# Patient Record
Sex: FEMALE | Race: WHITE | URBAN - METROPOLITAN AREA
[De-identification: names, ages, dates, MRNs, and addresses within clinical notes are randomized per-mention and may not be internally consistent; named-entity substitution may affect disease eponyms.]

---

## 2022-06-02 DIAGNOSIS — E89.0 POSTSURGICAL HYPOTHYROIDISM: Primary | ICD-10-CM

## 2022-06-02 DIAGNOSIS — E89.2 POSTSURGICAL HYPOPARATHYROIDISM (HCC): ICD-10-CM

## 2022-09-09 DIAGNOSIS — E89.0 POSTSURGICAL HYPOTHYROIDISM: ICD-10-CM

## 2022-09-09 DIAGNOSIS — E89.2 POSTSURGICAL HYPOPARATHYROIDISM (HCC): ICD-10-CM

## 2022-09-09 LAB
25(OH)D3 SERPL-MCNC: 29.3 NG/ML (ref 30–100)
ALBUMIN SERPL-MCNC: 3.8 G/DL (ref 3.5–5)
ALBUMIN/GLOB SERPL: 0.9 {RATIO} (ref 1.2–3.5)
ALP SERPL-CCNC: 75 U/L (ref 50–136)
ALT SERPL-CCNC: 20 U/L (ref 12–65)
ANION GAP SERPL CALC-SCNC: 7 MMOL/L (ref 4–13)
AST SERPL-CCNC: 12 U/L (ref 15–37)
BILIRUB SERPL-MCNC: 0.4 MG/DL (ref 0.2–1.1)
BUN SERPL-MCNC: 10 MG/DL (ref 6–23)
CALCIUM SERPL-MCNC: 8.6 MG/DL (ref 8.3–10.4)
CHLORIDE SERPL-SCNC: 103 MMOL/L (ref 101–110)
CO2 SERPL-SCNC: 28 MMOL/L (ref 21–32)
CREAT SERPL-MCNC: 0.8 MG/DL (ref 0.6–1)
GLOBULIN SER CALC-MCNC: 4.2 G/DL (ref 2.3–3.5)
GLUCOSE SERPL-MCNC: 89 MG/DL (ref 65–100)
POTASSIUM SERPL-SCNC: 3.8 MMOL/L (ref 3.5–5.1)
PROT SERPL-MCNC: 8 G/DL (ref 6.3–8.2)
SODIUM SERPL-SCNC: 138 MMOL/L (ref 136–145)
T4 FREE SERPL-MCNC: 1.7 NG/DL (ref 0.78–1.46)
TSH, 3RD GENERATION: 0.57 UIU/ML (ref 0.36–3.74)

## 2022-09-14 ENCOUNTER — OFFICE VISIT (OUTPATIENT)
Dept: ENDOCRINOLOGY | Age: 42
End: 2022-09-14
Payer: MEDICAID

## 2022-09-14 VITALS
BODY MASS INDEX: 33.12 KG/M2 | WEIGHT: 199 LBS | DIASTOLIC BLOOD PRESSURE: 76 MMHG | OXYGEN SATURATION: 98 % | HEART RATE: 103 BPM | SYSTOLIC BLOOD PRESSURE: 98 MMHG

## 2022-09-14 DIAGNOSIS — E89.0 POSTSURGICAL HYPOTHYROIDISM: Primary | ICD-10-CM

## 2022-09-14 DIAGNOSIS — E89.2 POSTSURGICAL HYPOPARATHYROIDISM (HCC): ICD-10-CM

## 2022-09-14 PROCEDURE — 99214 OFFICE O/P EST MOD 30 MIN: CPT | Performed by: INTERNAL MEDICINE

## 2022-09-14 RX ORDER — UREA 10 %
1 LOTION (ML) TOPICAL 2 TIMES DAILY
Qty: 180 TABLET | Refills: 3
Start: 2022-09-14

## 2022-09-14 RX ORDER — LEVOTHYROXINE SODIUM 0.12 MG/1
125 TABLET ORAL
Qty: 30 TABLET | Refills: 11 | Status: SHIPPED | OUTPATIENT
Start: 2022-09-14

## 2022-09-14 NOTE — PROGRESS NOTES
Tali Estrada MD, [address]            Reason for visit: Follow-up of hypothyroidism      ASSESSMENT AND PLAN:    1. Postsurgical hypothyroidism  She is biochemically euthyroid. Continue levothyroxine as currently prescribed. - levothyroxine (SYNTHROID) 125 MCG tablet; Take 1 tablet by mouth every morning (before breakfast)  Dispense: 30 tablet; Refill: 11  - TSH with Reflex; Future    2. Postsurgical hypoparathyroidism (HCC)  Calcium remains slightly low, probably because she only takes the calcium supplement every other day. No changes. - calcium carbonate (OS-KARTHIKEYAN) 1250 (500 Ca) MG chewable tablet; Take 1 tablet by mouth 2 times daily  Dispense: 180 tablet; Refill: 3  - Comprehensive Metabolic Panel; Future  - Vitamin D 25 Hydroxy; Future      Follow-up and Dispositions    Return in about 1 year (around 9/14/2023). History of Present Illness:    THYROID DYSFUNCTION  Zamzam Norton is seen for follow-up of hypothyroidism following thyroidectomy in February 2017. Hyperthyroidism was diagnosed in June 2016. Thyroidectomy was performed by Dr. Albania Hernandez (otolaryngologist in [place]) due to the presence of severe hyperthyroidism secondary to Graves' disease refractory to medical therapy. Surgery was complicated by postsurgical hypoparathyroidism which required treatment with calcium and calcitriol. She currently receives calcium and vitamin D (but not calcitriol) for treatment of hypoparathyroidism. Hypothyroidism is currently treated with levothyroxine. Current symptoms: See review of systems below     Prior treatment of hypothyroidism: She was started on levothyroxine 125 mcg daily following thryoidectomy. Prior treatment of hypocalcemia:  Calcitriol 0.25 mcg TID and calcium 500 mg/vitamin D3 200 units 2 tablets QID were started 2/3/2017.   Her calcitriol dose was increased to 0.5 mcg BID 6/12/2017, to 0.5 mcg TID 7/21/2017, and to 1 mcg BID 9/1/2017. On her own she discontinued the calcitriol and calcium in early October 2017. I had her resume calcium and vitamin D 4/23/2018. She stopped it on her own ~December 2019. Pertinent labs:  2/3/2017: TSH 0.007, free T4 1.64, calcium 6.2, intact parathyroid hormone 8.0.  2/6/2017: Calcium 6.1.  2/9/2017: Calcium 5.8.  2/14/2017: Intact parathyroid hormone 22.  2/20/2017: Calcium 7.4, intact parathyroid hormone 41.2.  2/26/2017: 25 hydroxy vitamin D 32.  4/18/2017: TSH 0.022, free T4 1.36, calcium 7.6, albumin 4.4, creatinine 0.7.  6/9/2017: TSH 1.270, free T4 1.80, calcium 7.6, ionized calcium 4.0 (reference 4.5-5.6), creatinine 0.76, intact parathyroid hormone 35, 25 hydroxy vitamin D 36.8.  7/18/2017: TSH 1.360, free T4 1.59, calcium 7.8, ionized calcium 4.2 (reference 4.5-5.6), creatinine 0.70, intact parathyroid hormone 39, 25 hydroxy vitamin D 34.2.  8/29/2017: Calcium 8.1, ionized calcium 4.2 (reference 4.5-5.6), creatinine 0.80.  12/6/2017: TSH 1.850, free T4 1.83, calcium 8.6, ionized calcium 4.7 (reference 4.5-5.6), creatinine 0.81.  4/17/2018: TSH 0.657, free T4 1.98, calcium 8.1, phosphorus 3.7, creatinine 0.72, intact parathyroid hormone 24, 25 hydroxy vitamin D 34.9.  1/24/2019: TSH 1.470, free T4 1.89, calcium 8.9, intact parathyroid hormone 24, 25 hydroxy vitamin D 30.6.  7/26/2019: TSH 2.000, free T4 1.65, calcium 9.0, 25 hydroxy vitamin D 39.0.  1/28/2020: TSH 1.810, free T4 1.60, calcium 8.8, 25 hydroxy vitamin D 32.6.  9/3/2020: TSH 0.503, free T4 1.86, calcium 8.4, 25 hydroxy vitamin D 41.8.  3/3/2021: TSH 1.010, free T4 1.89, calcium 8.5, 25 hydroxy vitamin D 35.1.  8/27/2021: TSH 0.704, free T4 2.06, calcium 9.1, 25 hydroxy vitamin D 30.1.  3/2/2022: TSH 0.579, free T4 2.01, calcium 8.4, 25 hydroxy vitamin D 31.3.  9/9/2022: TSH 0.567, free T4 1.7, calcium 8.6, 25 hydroxy vitamin D 29.3.     Review of Systems   Constitutional:  Negative for fatigue and unexpected weight change. BP 98/76 (Site: Right Upper Arm, Position: Sitting)   Pulse (!) 103   Wt 199 lb (90.3 kg)   SpO2 98%   BMI 33.12 kg/m²   Wt Readings from Last 3 Encounters:   09/14/22 199 lb (90.3 kg)   03/09/22 202 lb (91.6 kg)   09/09/21 201 lb (91.2 kg)       Physical Exam  HENT:      Head: Normocephalic. Neck:      Thyroid: No thyroid mass or thyromegaly. Comments: Healed thyroidectomy scar. No palpable neck masses. Cardiovascular:      Rate and Rhythm: Normal rate. Pulmonary:      Effort: No respiratory distress. Breath sounds: Normal breath sounds. Neurological:      Mental Status: She is alert. Psychiatric:         Mood and Affect: Mood normal.         Behavior: Behavior normal.       Orders Placed This Encounter   Procedures    TSH with Reflex     Standing Status:   Future     Standing Expiration Date:   9/14/2024    Comprehensive Metabolic Panel     Standing Status:   Future     Standing Expiration Date:   9/14/2024    Vitamin D 25 Hydroxy     Standing Status:   Future     Standing Expiration Date:   9/14/2024       Current Outpatient Medications   Medication Sig Dispense Refill    levothyroxine (SYNTHROID) 125 MCG tablet Take 1 tablet by mouth every morning (before breakfast) 30 tablet 11    calcium carbonate (OS-KARTHIKEYAN) 1250 (500 Ca) MG chewable tablet Take 1 tablet by mouth 2 times daily 180 tablet 3     No current facility-administered medications for this visit. Tali Estrada MD, F[facility]      Portions of this note were generated with the assistance of voice recognition software. As such, some errors in transcription may be present.

## 2023-09-08 DIAGNOSIS — E89.2 POSTSURGICAL HYPOPARATHYROIDISM (HCC): ICD-10-CM

## 2023-09-08 DIAGNOSIS — E89.0 POSTSURGICAL HYPOTHYROIDISM: ICD-10-CM

## 2023-09-08 LAB
25(OH)D3 SERPL-MCNC: 27.9 NG/ML (ref 30–100)
ALBUMIN SERPL-MCNC: 3.8 G/DL (ref 3.5–5)
ALBUMIN/GLOB SERPL: 0.9 (ref 0.4–1.6)
ALP SERPL-CCNC: 69 U/L (ref 50–136)
ALT SERPL-CCNC: 23 U/L (ref 12–65)
ANION GAP SERPL CALC-SCNC: 4 MMOL/L (ref 2–11)
AST SERPL-CCNC: 19 U/L (ref 15–37)
BILIRUB SERPL-MCNC: 0.4 MG/DL (ref 0.2–1.1)
BUN SERPL-MCNC: 9 MG/DL (ref 6–23)
CALCIUM SERPL-MCNC: 8.6 MG/DL (ref 8.3–10.4)
CHLORIDE SERPL-SCNC: 106 MMOL/L (ref 101–110)
CO2 SERPL-SCNC: 29 MMOL/L (ref 21–32)
CREAT SERPL-MCNC: 0.8 MG/DL (ref 0.6–1)
GLOBULIN SER CALC-MCNC: 4.1 G/DL (ref 2.8–4.5)
GLUCOSE SERPL-MCNC: 77 MG/DL (ref 65–100)
POTASSIUM SERPL-SCNC: 3.5 MMOL/L (ref 3.5–5.1)
PROT SERPL-MCNC: 7.9 G/DL (ref 6.3–8.2)
SODIUM SERPL-SCNC: 139 MMOL/L (ref 133–143)
TSH W FREE THYROID IF ABNORMAL: 0.3 UIU/ML (ref 0.36–3.74)

## 2023-09-09 LAB — T4 FREE SERPL-MCNC: 1.5 NG/DL (ref 0.78–1.46)

## 2023-09-12 NOTE — PROGRESS NOTES
Mariah Hill MD, [facility]            Reason for visit: Follow-up of hypothyroidism      ASSESSMENT AND PLAN:    1. Postsurgical hypothyroidism  Her most recent TSH is minimally suppressed but has been normal on her current levothyroxine dose in the past..  Continue levothyroxine as currently prescribed. Return in 6 months with labs. - levothyroxine (SYNTHROID) 125 MCG tablet; Take 1 tablet by mouth every morning (before breakfast)  Dispense: 30 tablet; Refill: 11    2. Postsurgical hypoparathyroidism (HCC)  Calcium remains slightly low, probably because she only takes the calcium supplement every other day. No changes. - calcium carbonate (OS-KARTHIKEYAN) 1250 (500 Ca) MG chewable tablet; Take 1 tablet by mouth 2 times daily  Dispense: 180 tablet; Refill: 3      Follow-up and Dispositions    Return in about 6 months (around 3/13/2024). History of Present Illness:    THYROID DYSFUNCTION  Nabil Johnson is seen for follow-up of hypothyroidism following thyroidectomy in February 2017. Hyperthyroidism was diagnosed in June 2016. Thyroidectomy was performed by Dr. Eugenia Dia (otolaryngologist in [facility]) due to the presence of severe hyperthyroidism secondary to Graves' disease refractory to medical therapy. Surgery was complicated by postsurgical hypoparathyroidism which required treatment with calcium and calcitriol. She currently receives calcium and vitamin D (but not calcitriol) for treatment of hypoparathyroidism. Hypothyroidism is currently treated with levothyroxine. Current symptoms: See review of systems below     Prior treatment of hypothyroidism: She was started on levothyroxine 125 mcg daily following thryoidectomy. Prior treatment of hypocalcemia:  Calcitriol 0.25 mcg TID and calcium 500 mg/vitamin D3 200 units 2 tablets QID were started 2/3/2017.   Her calcitriol dose was increased to 0.5 mcg BID 6/12/2017, to 0.5 mcg

## 2023-09-13 ENCOUNTER — OFFICE VISIT (OUTPATIENT)
Dept: ENDOCRINOLOGY | Age: 43
End: 2023-09-13
Payer: COMMERCIAL

## 2023-09-13 VITALS — BODY MASS INDEX: 32.78 KG/M2 | SYSTOLIC BLOOD PRESSURE: 120 MMHG | WEIGHT: 197 LBS | DIASTOLIC BLOOD PRESSURE: 70 MMHG

## 2023-09-13 DIAGNOSIS — E89.2 POSTSURGICAL HYPOPARATHYROIDISM (HCC): ICD-10-CM

## 2023-09-13 DIAGNOSIS — E89.0 POSTSURGICAL HYPOTHYROIDISM: ICD-10-CM

## 2023-09-13 PROCEDURE — 99214 OFFICE O/P EST MOD 30 MIN: CPT | Performed by: INTERNAL MEDICINE

## 2023-09-13 RX ORDER — LEVOTHYROXINE SODIUM 0.12 MG/1
125 TABLET ORAL
Qty: 30 TABLET | Refills: 11 | Status: SHIPPED | OUTPATIENT
Start: 2023-09-13

## 2023-09-13 RX ORDER — UREA 10 %
1 LOTION (ML) TOPICAL 2 TIMES DAILY
Qty: 180 TABLET | Refills: 3
Start: 2023-09-13

## 2023-09-13 ASSESSMENT — ENCOUNTER SYMPTOMS
DIARRHEA: 0
CONSTIPATION: 0